# Patient Record
Sex: MALE | Race: WHITE
[De-identification: names, ages, dates, MRNs, and addresses within clinical notes are randomized per-mention and may not be internally consistent; named-entity substitution may affect disease eponyms.]

---

## 2020-11-16 LAB
BASOPHILS # BLD AUTO: 0 10*3/UL (ref 0–0.1)
BASOPHILS NFR BLD AUTO: 0.5 % (ref 0–1)
DEPRECATED NEUTROPHILS # BLD AUTO: 3.3 10*3/UL (ref 2.1–6.9)
EOSINOPHIL # BLD AUTO: 0.2 10*3/UL (ref 0–0.4)
EOSINOPHIL NFR BLD AUTO: 2.5 % (ref 0–6)
ERYTHROCYTE [DISTWIDTH] IN CORD BLOOD: 12.6 % (ref 11.7–14.4)
HCT VFR BLD AUTO: 40.3 % (ref 38.2–49.6)
HGB BLD-MCNC: 14 G/DL (ref 14–18)
LYMPHOCYTES # BLD: 1.8 10*3/UL (ref 1–3.2)
LYMPHOCYTES NFR BLD AUTO: 30 % (ref 18–39.1)
MCH RBC QN AUTO: 31.4 PG (ref 28–32)
MCHC RBC AUTO-ENTMCNC: 34.7 G/DL (ref 31–35)
MCV RBC AUTO: 90.4 FL (ref 81–99)
MONOCYTES # BLD AUTO: 0.6 10*3/UL (ref 0.2–0.8)
MONOCYTES NFR BLD AUTO: 10.3 % (ref 4.4–11.3)
NEUTS SEG NFR BLD AUTO: 56 % (ref 38.7–80)
PLATELET # BLD AUTO: 174 X10E3/UL (ref 140–360)
RBC # BLD AUTO: 4.46 X10E6/UL (ref 4.3–5.7)

## 2020-11-19 ENCOUNTER — HOSPITAL ENCOUNTER (OUTPATIENT)
Dept: HOSPITAL 88 - OR | Age: 70
Discharge: HOME | End: 2020-11-19
Attending: INTERNAL MEDICINE
Payer: MEDICARE

## 2020-11-19 VITALS — DIASTOLIC BLOOD PRESSURE: 73 MMHG | SYSTOLIC BLOOD PRESSURE: 115 MMHG

## 2020-11-19 DIAGNOSIS — Z01.812: ICD-10-CM

## 2020-11-19 DIAGNOSIS — K57.30: ICD-10-CM

## 2020-11-19 DIAGNOSIS — Z09: Primary | ICD-10-CM

## 2020-11-19 DIAGNOSIS — Z86.010: ICD-10-CM

## 2020-11-19 DIAGNOSIS — Z01.810: ICD-10-CM

## 2020-11-19 DIAGNOSIS — R00.1: ICD-10-CM

## 2020-11-19 DIAGNOSIS — I10: ICD-10-CM

## 2020-11-19 DIAGNOSIS — Z87.442: ICD-10-CM

## 2020-11-19 DIAGNOSIS — Z20.828: ICD-10-CM

## 2020-11-19 DIAGNOSIS — K64.8: ICD-10-CM

## 2020-11-19 PROCEDURE — 36415 COLL VENOUS BLD VENIPUNCTURE: CPT

## 2020-11-19 PROCEDURE — 93005 ELECTROCARDIOGRAM TRACING: CPT

## 2020-11-19 PROCEDURE — 85025 COMPLETE CBC W/AUTO DIFF WBC: CPT

## 2020-11-19 PROCEDURE — 45378 DIAGNOSTIC COLONOSCOPY: CPT

## 2020-11-19 NOTE — OPERATIVE REPORT
DATE OF PROCEDURE:  11/19/2020

 

SURGEON:  Zafar Burk MD

 

PROCEDURE:  Colonoscopy note.

 

INDICATIONS FOR COLONOSCOPY:  Surveillance colonoscopy, personal history of colon polyps.

 

MEDICATIONS:  The patient was done under MAC, please see anesthesiologist's note.

 

PROCEDURE IN DETAIL:  With the patient in left lateral decubitus position, a flexible

fiberoptic Olympus colonoscope was inserted into the rectum with ease and advanced all

the way to the cecum.  The scope was then withdrawn slowly, mucosa overlying the cecum,

ascending colon, transverse colon, descending colon appeared to be within normal limits.

 Minimal diverticular disease was noted in the sigmoid colon.  The rectum appeared to be

within normal limits.  The scope was then retroflexed into the distal rectum, small

internal hemorrhoids were noted, none of which was actively bleeding.  The scope was

then straightened out, it was subsequently withdrawn.  The patient tolerated the

procedure well. 

 

IMPRESSION:  

1. Diverticulosis, minimal.

2. Internal hemorrhoids, none actively bleeding.

 

PLAN:  

1. Initiate high-fiber, low-fat diet.

2. Initiate high-fiber supplement.

3. The patient might benefit from a followup colonoscopy in 5 years.

 

 

 

 

______________________________

Zafar Burk MD

 

[facility] – [place]/[facility]

D:  11/19/2020 13:08:53

T:  11/19/2020 14:13:48

Job #:  [id number]

 

cc:            Paramjit Real MD